# Patient Record
Sex: FEMALE | Race: BLACK OR AFRICAN AMERICAN | Employment: OTHER | URBAN - METROPOLITAN AREA
[De-identification: names, ages, dates, MRNs, and addresses within clinical notes are randomized per-mention and may not be internally consistent; named-entity substitution may affect disease eponyms.]

---

## 2018-02-18 ENCOUNTER — ED HISTORICAL/CONVERTED ENCOUNTER (OUTPATIENT)
Dept: OTHER | Age: 78
End: 2018-02-18

## 2018-11-22 ENCOUNTER — ED HISTORICAL/CONVERTED ENCOUNTER (OUTPATIENT)
Dept: OTHER | Age: 78
End: 2018-11-22

## 2019-09-16 ENCOUNTER — OP HISTORICAL/CONVERTED ENCOUNTER (OUTPATIENT)
Dept: OTHER | Age: 79
End: 2019-09-16

## 2019-10-02 ENCOUNTER — OP HISTORICAL/CONVERTED ENCOUNTER (OUTPATIENT)
Dept: OTHER | Age: 79
End: 2019-10-02

## 2019-10-11 ENCOUNTER — ED HISTORICAL/CONVERTED ENCOUNTER (OUTPATIENT)
Dept: OTHER | Age: 79
End: 2019-10-11

## 2019-10-15 ENCOUNTER — IP HISTORICAL/CONVERTED ENCOUNTER (OUTPATIENT)
Dept: OTHER | Age: 79
End: 2019-10-15

## 2020-01-04 ENCOUNTER — ED HISTORICAL/CONVERTED ENCOUNTER (OUTPATIENT)
Dept: OTHER | Age: 80
End: 2020-01-04

## 2020-01-16 ENCOUNTER — OP HISTORICAL/CONVERTED ENCOUNTER (OUTPATIENT)
Dept: OTHER | Age: 80
End: 2020-01-16

## 2020-01-21 ENCOUNTER — HOSPITAL ENCOUNTER (EMERGENCY)
Age: 80
Discharge: HOME | End: 2020-01-21
Attending: EMERGENCY MEDICINE | Admitting: EMERGENCY MEDICINE
Payer: MEDICARE

## 2020-01-21 ENCOUNTER — OFFICE VISIT (OUTPATIENT)
Dept: INTERNAL MEDICINE CLINIC | Age: 80
End: 2020-01-21

## 2020-01-21 ENCOUNTER — OP HISTORICAL/CONVERTED ENCOUNTER (OUTPATIENT)
Dept: OTHER | Age: 80
End: 2020-01-21

## 2020-01-21 VITALS
WEIGHT: 137 LBS | BODY MASS INDEX: 25.21 KG/M2 | HEART RATE: 75 BPM | HEIGHT: 62 IN | OXYGEN SATURATION: 95 % | SYSTOLIC BLOOD PRESSURE: 120 MMHG | DIASTOLIC BLOOD PRESSURE: 62 MMHG | TEMPERATURE: 98.5 F | RESPIRATION RATE: 16 BRPM

## 2020-01-21 VITALS
OXYGEN SATURATION: 96 % | HEART RATE: 86 BPM | DIASTOLIC BLOOD PRESSURE: 73 MMHG | RESPIRATION RATE: 16 BRPM | SYSTOLIC BLOOD PRESSURE: 151 MMHG | TEMPERATURE: 97.5 F

## 2020-01-21 DIAGNOSIS — R45.1 AGITATION: ICD-10-CM

## 2020-01-21 DIAGNOSIS — R41.0 CONFUSION: ICD-10-CM

## 2020-01-21 DIAGNOSIS — F33.1 MODERATE EPISODE OF RECURRENT MAJOR DEPRESSIVE DISORDER (HCC): ICD-10-CM

## 2020-01-21 DIAGNOSIS — F03.91 DEMENTIA WITH BEHAVIORAL DISTURBANCE, UNSPECIFIED DEMENTIA TYPE: Primary | ICD-10-CM

## 2020-01-21 DIAGNOSIS — R53.83 FATIGUE, UNSPECIFIED TYPE: ICD-10-CM

## 2020-01-21 DIAGNOSIS — F41.8 ANXIETY ASSOCIATED WITH DEPRESSION: Primary | ICD-10-CM

## 2020-01-21 DIAGNOSIS — Z76.89 ENCOUNTER TO ESTABLISH CARE WITH NEW DOCTOR: ICD-10-CM

## 2020-01-21 DIAGNOSIS — F32.A DEPRESSION, UNSPECIFIED DEPRESSION TYPE: ICD-10-CM

## 2020-01-21 DIAGNOSIS — I10 ESSENTIAL HYPERTENSION: ICD-10-CM

## 2020-01-21 PROCEDURE — 99282 EMERGENCY DEPT VISIT SF MDM: CPT

## 2020-01-21 RX ORDER — QUETIAPINE FUMARATE 25 MG/1
TABLET, FILM COATED ORAL
COMMUNITY

## 2020-01-21 RX ORDER — TRAZODONE HYDROCHLORIDE 100 MG/1
100 TABLET ORAL
COMMUNITY

## 2020-01-21 RX ORDER — DONEPEZIL HYDROCHLORIDE 10 MG/1
10 TABLET, FILM COATED ORAL
COMMUNITY

## 2020-01-21 RX ORDER — MEMANTINE HYDROCHLORIDE 5 MG/1
TABLET ORAL DAILY
COMMUNITY

## 2020-01-21 RX ORDER — AMLODIPINE BESYLATE 10 MG/1
TABLET ORAL DAILY
COMMUNITY

## 2020-01-21 RX ORDER — ACETAMINOPHEN AND CODEINE PHOSPHATE 300; 30 MG/1; MG/1
1 TABLET ORAL
COMMUNITY
End: 2020-01-21

## 2020-01-21 RX ORDER — SERTRALINE HYDROCHLORIDE 25 MG/1
TABLET, FILM COATED ORAL DAILY
COMMUNITY

## 2020-01-21 RX ORDER — OMEPRAZOLE 40 MG/1
40 CAPSULE, DELAYED RELEASE ORAL DAILY
COMMUNITY

## 2020-01-21 RX ORDER — OLANZAPINE 5 MG/1
TABLET ORAL
COMMUNITY

## 2020-01-21 RX ORDER — CLONIDINE HYDROCHLORIDE 0.2 MG/1
TABLET ORAL 2 TIMES DAILY
COMMUNITY

## 2020-01-21 NOTE — PROGRESS NOTES
Chief Complaint   Patient presents with   [address]         3 most recent PHQ Screens 1/21/2020   Little interest or pleasure in doing things Not at all   Feeling down, depressed, irritable, or hopeless Several days   Total Score PHQ 2 1     Fall Risk Assessment, last 12 mths 1/21/2020   Able to walk? Yes   Fall in past 12 months? No     Abuse Screening Questionnaire 1/21/2020   Do you ever feel afraid of your partner? N   Are you in a relationship with someone who physically or mentally threatens you? N   Is it safe for you to go home? Y         1. Have you been to the ER, urgent care clinic since your last visit? Hospitalized since your last visit? Yes When: 01/04/2020 Where: [address] Reason for visit: Aggression    2. Have you seen or consulted any other health care providers outside of the [address] [place] [address] Mehdi since your last visit? Include any pap smears or colon screening.  Yes When: 01/06/2020 Where: Dr. Mary Agarwal Reason for visit: UTI

## 2020-01-21 NOTE — PATIENT INSTRUCTIONS
Ms. Emmanuelle Bacon (MMSE = 1) is willing to go to [facility] Emergency Room to see the emotional health providers so she can feel better; she is tired of feeling bad all the time. Please evaluate patient separate from caretaker, discuss with caretaker out of earshot from patient. Labs were ordered but not drawn.
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
## 2020-01-22 PROBLEM — F33.1 MODERATE EPISODE OF RECURRENT MAJOR DEPRESSIVE DISORDER (HCC): Status: ACTIVE | Noted: 2020-01-22

## 2020-01-22 PROBLEM — F03.90 DEMENTIA (HCC): Status: ACTIVE | Noted: 2020-01-22

## 2020-01-22 NOTE — ED NOTES
Patient received discharge instructions by MD. Patient verbalized understanding of medication use and other discharge instructions. Patient wheeled out of ED, showing no signs of distress. Pt reports relief of most intense pain. All questions answered.

## 2020-01-22 NOTE — BSMART NOTE
Fredy has spoken to patient's caregiver/Evangelina who was advised by Jennie Ireland LCSW to \"bring patient to ED for a mental health evaluation so she could be TDOd. \". Patient is a 71 yo female with advanced dementia. She is only aware of person and lacks capacity to care for self. She is not psychotic, suicidal or homicidal. Care giver is able to provide 24/7 care for patient in her own home. Care giver reports patient has a niece in [place] who has POA and the family had taken patient out of assisted living to try her current care provider. However, current care provider says she cannot provide adequate care any longer because patient \"fights me\" when I try to bathe her or help her with other ADLs. This writer called [employer] and spoke to [facility] who report patient does not meet criteria for a psych admission and care giver should be advised to contact POA in order to step up care management of patient to a more appropriate level of care ie, assisted living or nursing home. This information was conveyed to care giver. Patient will be medically evaluated, especially for a possible UTI, and discharged to current care provider/Evangelina who will follow up with patient's POA.

## 2020-01-22 NOTE — ED PROVIDER NOTES
70 y.o. female with past medical history significant for dementia, depression, and anxiety who presents from home with chief complaint of mental health problem. Per medical records patient was evaluated by Dr. Wendi Saeed PTA to establish care of her dementia, confusion, and insomnia (see chart review). Patient was referred to ED to see [facility] and receive further psych w/u. Patient presents to [facility] ED for evaluation of mental health problems stating Rolando Johns has helped everyone in her life, black and white, and she would never hurt anyone. \" Patient is tearful during evaluation. Patient c/o loss of appetite and general \"body pain. \" Patient denies hematuria and nausea. There are no other acute medical concerns at this time. Social hx: No Tobacco use, No EtOH use, No illicit drug use. PCP: Manda Hess MD    Full history, physical exam, and ROS unable to be obtained due to: Dementia. Chart Review: Per Dr. Geovanny Cohen: pt has dementia, intermittently gets aggressive, uncooperative. Pt, previously very friendly to caretake, became angry, untrusting towards caretaker when caretaker was explaining pt's behavior to nurse. Pt feels she has helped people all her life, giving them things but they have abandoned her or treated her mean or say bad things about her now. Pt clearly, sometimes eloquently verbally articulates past events and how she is feeling but is unable to name a \"pen\" or write a legible sentence; she put words in the clock [place] instead of the numbers around the clock. Pt was recently treated at [facility] for UTI per caretaker but has had no subsequent complaints. Note written by Cristofer Long, as dictated by Lucie Eubanks MD 8:30 PM     The history is provided by the patient and medical records. No  was used.         Past Medical History:   Diagnosis Date    Anxiety     CHF (congestive heart failure) (HCC)     Chronic UTI     Dementia (HCC)     Depression     GERD (gastroesophageal reflux disease)     Hypertension     Lactose intolerance     Weight loss        History reviewed. No pertinent surgical history. Family History:   Family history unknown: Yes       Social History     Socioeconomic History    Marital status:      Spouse name: Not on file    Number of children: Not on file    Years of education: Not on file    Highest education level: Not on file   Occupational History    Not on file   Social Needs    Financial resource strain: Not on file    Food insecurity:     Worry: Not on file     Inability: Not on file    Transportation needs:     Medical: Not on file     Non-medical: Not on file   Tobacco Use    Smoking status: Never Smoker    Smokeless tobacco: Never Used   Substance and Sexual Activity    Alcohol use: Not Currently    Drug use: Not Currently    Sexual activity: Not Currently   Lifestyle    Physical activity:     Days per week: Not on file     Minutes per session: Not on file    Stress: Not on file   Relationships    Social connections:     Talks on phone: Not on file     Gets together: Not on file     Attends Sikhism service: Not on file     Active member of club or organization: Not on file     Attends meetings of clubs or organizations: Not on file     Relationship status: Not on file    Intimate partner violence:     Fear of current or ex partner: Not on file     Emotionally abused: Not on file     Physically abused: Not on file     Forced sexual activity: Not on file   Other Topics Concern    Not on file   Social History Narrative    Not on file         ALLERGIES: Penicillin g    Review of Systems   Unable to perform ROS: Dementia       Vitals:    01/21/20 1730   BP: 151/73   Pulse: 86   Resp: 16   Temp: 97.5 °F (36.4 °C)   SpO2: 96%            Physical Exam  Vitals signs and nursing note reviewed. Constitutional:       General: She is not in acute distress. Appearance: She is well-developed.  She is not ill-appearing, toxic-appearing or diaphoretic. HENT:      Head: Normocephalic. Nose: Nose normal.   Eyes:      General: No scleral icterus. Conjunctiva/sclera: Conjunctivae normal.      Pupils: Pupils are equal, round, and reactive to light. Neck:      Musculoskeletal: Normal range of motion and neck supple. Thyroid: No thyromegaly. Vascular: No JVD. Trachea: No tracheal deviation. Comments: No carotid bruits noted. Cardiovascular:      Rate and Rhythm: Normal rate and regular rhythm. Heart sounds: Normal heart sounds. No murmur. No friction rub. No gallop. Pulmonary:      Effort: Pulmonary effort is normal. No respiratory distress. Breath sounds: Normal breath sounds. No wheezing or rales. Chest:      Chest wall: No tenderness. Abdominal:      General: Bowel sounds are normal. There is no distension. Palpations: Abdomen is soft. There is no mass. Tenderness: There is no tenderness. There is no guarding or rebound. Musculoskeletal: Normal range of motion. General: No tenderness. Lymphadenopathy:      Cervical: No cervical adenopathy. Skin:     General: Skin is warm and dry. Findings: No erythema or rash. Neurological:      Mental Status: She is alert. Cranial Nerves: No cranial nerve deficit. Coordination: Coordination normal.      Deep Tendon Reflexes: Reflexes are normal and symmetric. Psychiatric:         Mood and Affect: Affect is tearful. Speech: Speech normal.         Behavior: Behavior is cooperative. Note written by Crsitofer Dickerson, as dictated by Mingo Nelson MD 8:30 PM      MDM  Number of Diagnoses or Management Options  Anxiety associated with depression:          Procedures    Patient was evaluated by [facility] for TDO. Was not found to meet criteria for the same. She is discharged home to follow up with crisis as direct by the counsellor or with Dr. Lucho Kelly for continued treatment.
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
## 2020-02-05 PROBLEM — F32.A DEPRESSION: Status: ACTIVE | Noted: 2020-02-05

## 2020-02-05 PROBLEM — I10 ESSENTIAL HYPERTENSION: Status: ACTIVE | Noted: 2020-02-05

## 2020-02-07 ENCOUNTER — ED HISTORICAL/CONVERTED ENCOUNTER (OUTPATIENT)
Dept: OTHER | Age: 80
End: 2020-02-07
